# Patient Record
Sex: MALE | Race: WHITE | URBAN - METROPOLITAN AREA
[De-identification: names, ages, dates, MRNs, and addresses within clinical notes are randomized per-mention and may not be internally consistent; named-entity substitution may affect disease eponyms.]

---

## 2017-01-04 ENCOUNTER — DOCTOR'S OFFICE (OUTPATIENT)
Dept: URBAN - METROPOLITAN AREA CLINIC 136 | Facility: CLINIC | Age: 37
Setting detail: OPHTHALMOLOGY
End: 2017-01-04

## 2017-01-04 DIAGNOSIS — H52.13: ICD-10-CM

## 2017-01-04 DIAGNOSIS — H52.223: ICD-10-CM

## 2017-01-04 DIAGNOSIS — H43.393: ICD-10-CM

## 2017-01-04 DIAGNOSIS — H35.413: ICD-10-CM

## 2017-01-04 PROCEDURE — NO CHARGE N/C PROFESSIONAL COURTESY: Performed by: OPTOMETRIST

## 2017-01-04 ASSESSMENT — REFRACTION_AUTOREFRACTION
OS_AXIS: 109
OD_CYLINDER: -0.50
OD_AXIS: 087
OS_CYLINDER: -1.00
OS_SPHERE: -6.00
OD_SPHERE: -6.75

## 2017-01-04 ASSESSMENT — REFRACTION_CURRENTRX
OD_VPRISM_DIRECTION: SV
OS_CYLINDER: -1.25
OS_VPRISM_DIRECTION: SV
OD_AXIS: 085
OS_OVR_VA: 20/
OD_OVR_VA: 20/
OS_AXIS: 092
OS_SPHERE: -6.00
OD_OVR_VA: 20/
OD_SPHERE: -6.50
OD_CYLINDER: -0.25
OS_OVR_VA: 20/
OS_OVR_VA: 20/
OD_OVR_VA: 20/

## 2017-01-04 ASSESSMENT — REFRACTION_OUTSIDERX
OS_CYLINDER: -1.25
OS_VA1: 20/20
OD_VA2: 20/20
OU_VA: 20/20
OD_CYLINDER: -0.50
OS_SPHERE: -6.00
OD_VA1: 20/20
OD_VA3: 20/
OS_VA3: 20/
OD_AXIS: 080
OD_SPHERE: -6.50
OS_VA2: 20/20
OS_AXIS: 105

## 2017-01-04 ASSESSMENT — REFRACTION_MANIFEST
OD_VA3: 20/
OS_VA1: 20/
OS_VA2: 20/
OU_VA: 20/
OD_VA3: 20/
OD_VA1: 20/
OD_VA1: 20/
OS_VA2: 20/
OS_VA3: 20/
OS_VA3: 20/
OD_VA2: 20/
OU_VA: 20/
OS_VA1: 20/
OD_VA2: 20/

## 2017-01-04 ASSESSMENT — SUPERFICIAL PUNCTATE KERATITIS (SPK)
OD_SPK: ABSENT
OS_SPK: ABSENT

## 2017-01-04 ASSESSMENT — CONFRONTATIONAL VISUAL FIELD TEST (CVF)
OS_FINDINGS: FULL
OD_FINDINGS: FULL

## 2017-01-04 ASSESSMENT — AXIALLENGTH_DERIVED
OS_AL: 26.7201
OD_AL: 26.9724

## 2017-01-04 ASSESSMENT — DECREASING TEAR LAKE - SEVERITY SCORE
OD_DEC_TEARLAKE: T
OS_DEC_TEARLAKE: T

## 2017-01-04 ASSESSMENT — VISUAL ACUITY
OS_BCVA: 20/20
OD_BCVA: 20/20-2

## 2017-01-04 ASSESSMENT — KERATOMETRY
OD_AXISANGLE_DEGREES: 029
OS_K1POWER_DIOPTERS: 42.75
OD_K2POWER_DIOPTERS: 43.00
OD_K1POWER_DIOPTERS: 42.75
OS_AXISANGLE_DEGREES: 135
OS_K2POWER_DIOPTERS: 43.00
METHOD_AUTO_MANUAL: AUTO

## 2017-01-04 ASSESSMENT — SPHEQUIV_DERIVED
OS_SPHEQUIV: -6.5
OD_SPHEQUIV: -7

## 2017-01-04 ASSESSMENT — TEAR BREAK UP TIME (TBUT)
OD_TBUT: 7S
OS_TBUT: 7S

## 2018-02-14 ENCOUNTER — DOCTOR'S OFFICE (OUTPATIENT)
Dept: URBAN - METROPOLITAN AREA CLINIC 136 | Facility: CLINIC | Age: 38
Setting detail: OPHTHALMOLOGY
End: 2018-02-14

## 2018-02-14 DIAGNOSIS — H52.13: ICD-10-CM

## 2018-02-14 DIAGNOSIS — H35.413: ICD-10-CM

## 2018-02-14 DIAGNOSIS — H43.393: ICD-10-CM

## 2018-02-14 DIAGNOSIS — D31.31: ICD-10-CM

## 2018-02-14 DIAGNOSIS — H52.223: ICD-10-CM

## 2018-02-14 PROCEDURE — NO CHARGE N/C PROFESSIONAL COURTESY: Performed by: OPTOMETRIST

## 2018-02-14 ASSESSMENT — SUPERFICIAL PUNCTATE KERATITIS (SPK)
OS_SPK: ABSENT
OD_SPK: ABSENT

## 2018-02-14 ASSESSMENT — REFRACTION_OUTSIDERX
OD_AXIS: 095
OD_SPHERE: -6.50
OS_VA3: 20/
OU_VA: 20/20
OD_VA2: 20/20
OD_VA3: 20/
OS_VA2: 20/20
OS_AXIS: 105
OD_VA1: 20/20
OD_CYLINDER: -0.50
OS_VA1: 20/20
OS_SPHERE: -6.00
OS_CYLINDER: -1.25

## 2018-02-14 ASSESSMENT — AXIALLENGTH_DERIVED
OD_AL: 26.8457
OS_AL: 26.9089

## 2018-02-14 ASSESSMENT — VISUAL ACUITY
OD_BCVA: 20/25
OS_BCVA: 20/25

## 2018-02-14 ASSESSMENT — REFRACTION_AUTOREFRACTION
OD_AXIS: 094
OS_AXIS: 104
OD_CYLINDER: -0.50
OS_SPHERE: -6.25
OS_CYLINDER: -1.25
OD_SPHERE: -6.50

## 2018-02-14 ASSESSMENT — REFRACTION_CURRENTRX
OS_OVR_VA: 20/
OS_OVR_VA: 20/
OS_CYLINDER: -1.25
OD_OVR_VA: 20/
OD_OVR_VA: 20/
OS_SPHERE: -6.00
OD_SPHERE: -6.50
OD_CYLINDER: -0.25
OD_OVR_VA: 20/
OS_OVR_VA: 20/
OD_AXIS: 085
OS_VPRISM_DIRECTION: SV
OS_AXIS: 092
OD_VPRISM_DIRECTION: SV

## 2018-02-14 ASSESSMENT — CONFRONTATIONAL VISUAL FIELD TEST (CVF)
OS_FINDINGS: FULL
OD_FINDINGS: FULL

## 2018-02-14 ASSESSMENT — SPHEQUIV_DERIVED
OD_SPHEQUIV: -6.75
OS_SPHEQUIV: -6.875

## 2018-02-14 ASSESSMENT — REFRACTION_MANIFEST
OS_VA1: 20/
OD_VA2: 20/
OD_VA1: 20/
OD_VA1: 20/
OS_VA3: 20/
OD_VA3: 20/
OD_VA3: 20/
OS_VA3: 20/
OU_VA: 20/
OS_VA1: 20/
OS_VA2: 20/
OU_VA: 20/
OS_VA2: 20/
OD_VA2: 20/

## 2018-02-14 ASSESSMENT — KERATOMETRY
METHOD_AUTO_MANUAL: AUTO
OD_AXISANGLE_DEGREES: 029
OD_K2POWER_DIOPTERS: 43.00
OS_K1POWER_DIOPTERS: 42.75
OD_K1POWER_DIOPTERS: 42.75
OS_K2POWER_DIOPTERS: 43.00
OS_AXISANGLE_DEGREES: 135

## 2018-02-14 ASSESSMENT — TEAR BREAK UP TIME (TBUT)
OD_TBUT: 7S
OS_TBUT: 7S

## 2018-02-14 ASSESSMENT — DECREASING TEAR LAKE - SEVERITY SCORE
OD_DEC_TEARLAKE: T
OS_DEC_TEARLAKE: T

## 2018-09-25 ENCOUNTER — DOCTOR'S OFFICE (OUTPATIENT)
Dept: URBAN - METROPOLITAN AREA CLINIC 136 | Facility: CLINIC | Age: 38
Setting detail: OPHTHALMOLOGY
End: 2018-09-25

## 2018-09-25 DIAGNOSIS — D31.31: ICD-10-CM

## 2018-09-25 DIAGNOSIS — H35.413: ICD-10-CM

## 2018-09-25 PROCEDURE — NO CHARGE N/C PROFESSIONAL COURTESY: Performed by: OPTOMETRIST

## 2018-09-25 ASSESSMENT — SPHEQUIV_DERIVED
OS_SPHEQUIV: -6.875
OS_SPHEQUIV: -6.625
OD_SPHEQUIV: -6.75
OD_SPHEQUIV: -6.75

## 2018-09-25 ASSESSMENT — REFRACTION_CURRENTRX
OS_OVR_VA: 20/
OD_CYLINDER: -0.25
OD_OVR_VA: 20/
OD_OVR_VA: 20/
OS_VPRISM_DIRECTION: SV
OD_OVR_VA: 20/
OS_OVR_VA: 20/
OD_AXIS: 085
OD_SPHERE: -6.50
OS_SPHERE: -6.00
OD_VPRISM_DIRECTION: SV
OS_AXIS: 092
OS_OVR_VA: 20/
OS_CYLINDER: -1.25

## 2018-09-25 ASSESSMENT — REFRACTION_AUTOREFRACTION
OS_AXIS: 104
OD_CYLINDER: -0.50
OD_AXIS: 094
OS_SPHERE: -6.25
OS_CYLINDER: -1.25
OD_SPHERE: -6.50

## 2018-09-25 ASSESSMENT — REFRACTION_MANIFEST
OS_CYLINDER: -1.25
OD_VA2: 20/
OD_AXIS: 095
OS_VA1: 20/
OS_VA1: 20/20
OD_VA1: 20/20
OS_AXIS: 105
OD_VA1: 20/
OU_VA: 20/20
OD_CYLINDER: -0.50
OD_VA3: 20/
OS_VA3: 20/
OD_VA2: 20/20
OU_VA: 20/
OS_VA2: 20/20
OS_VA2: 20/
OD_VA3: 20/
OD_SPHERE: -6.50
OS_SPHERE: -6.00
OS_VA3: 20/

## 2018-09-25 ASSESSMENT — SUPERFICIAL PUNCTATE KERATITIS (SPK)
OS_SPK: ABSENT
OD_SPK: ABSENT

## 2018-09-25 ASSESSMENT — VISUAL ACUITY
OS_BCVA: 20/20
OD_BCVA: 20/20

## 2018-09-25 ASSESSMENT — TEAR BREAK UP TIME (TBUT)
OS_TBUT: 7S
OD_TBUT: 7S

## 2018-09-25 ASSESSMENT — DECREASING TEAR LAKE - SEVERITY SCORE
OD_DEC_TEARLAKE: T
OS_DEC_TEARLAKE: T

## 2018-09-25 ASSESSMENT — CONFRONTATIONAL VISUAL FIELD TEST (CVF)
OD_FINDINGS: FULL
OS_FINDINGS: FULL

## 2019-05-14 ENCOUNTER — DOCTOR'S OFFICE (OUTPATIENT)
Dept: URBAN - METROPOLITAN AREA CLINIC 136 | Facility: CLINIC | Age: 39
Setting detail: OPHTHALMOLOGY
End: 2019-05-14
Payer: COMMERCIAL

## 2019-05-14 DIAGNOSIS — H52.223: ICD-10-CM

## 2019-05-14 DIAGNOSIS — D31.31: ICD-10-CM

## 2019-05-14 DIAGNOSIS — H52.13: ICD-10-CM

## 2019-05-14 DIAGNOSIS — H43.393: ICD-10-CM

## 2019-05-14 DIAGNOSIS — H35.413: ICD-10-CM

## 2019-05-14 PROCEDURE — NO CHARGE N/C PROFESSIONAL COURTESY: Performed by: OPTOMETRIST

## 2019-05-14 PROCEDURE — 92250 FUNDUS PHOTOGRAPHY W/I&R: CPT | Performed by: OPTOMETRIST

## 2019-05-14 ASSESSMENT — AXIALLENGTH_DERIVED
OS_AL: 26.7828
OD_AL: 26.8457
OS_AL: 26.5958
OD_AL: 26.7828

## 2019-05-14 ASSESSMENT — REFRACTION_MANIFEST
OD_VA2: 20/
OD_VA1: 20/20
OD_SPHERE: -6.50
OS_VA3: 20/
OS_VA1: 20/
OD_VA3: 20/
OS_VA2: 20/
OS_AXIS: 100
OS_VA3: 20/
OS_SPHERE: -6.00
OS_VA1: 20/20
OS_CYLINDER: -1.25
OD_AXIS: 095
OD_VA3: 20/
OD_CYLINDER: -0.50
OU_VA: 20/20
OD_VA2: 20/20
OS_VA2: 20/20
OD_VA1: 20/
OU_VA: 20/

## 2019-05-14 ASSESSMENT — REFRACTION_CURRENTRX
OS_CYLINDER: -1.25
OD_OVR_VA: 20/
OD_OVR_VA: 20/
OD_CYLINDER: -0.25
OS_AXIS: 092
OD_VPRISM_DIRECTION: SV
OS_OVR_VA: 20/
OD_AXIS: 085
OS_VPRISM_DIRECTION: SV
OS_OVR_VA: 20/
OS_OVR_VA: 20/
OS_SPHERE: -6.00
OD_SPHERE: -6.50
OD_OVR_VA: 20/

## 2019-05-14 ASSESSMENT — TEAR BREAK UP TIME (TBUT)
OD_TBUT: 7S
OS_TBUT: 7S

## 2019-05-14 ASSESSMENT — SPHEQUIV_DERIVED
OS_SPHEQUIV: -6.625
OD_SPHEQUIV: -6.75
OS_SPHEQUIV: -6.25
OD_SPHEQUIV: -6.625

## 2019-05-14 ASSESSMENT — REFRACTION_AUTOREFRACTION
OS_AXIS: 96
OS_SPHERE: -5.50
OD_SPHERE: -6.25
OS_CYLINDER: -1.50
OD_AXIS: 86
OD_CYLINDER: -0.75

## 2019-05-14 ASSESSMENT — KERATOMETRY
METHOD_AUTO_MANUAL: AUTO
OS_AXISANGLE_DEGREES: 135
OD_AXISANGLE_DEGREES: 029
OD_K1POWER_DIOPTERS: 42.75
OS_K1POWER_DIOPTERS: 42.75
OS_K2POWER_DIOPTERS: 43.00
OD_K2POWER_DIOPTERS: 43.00

## 2019-05-14 ASSESSMENT — DECREASING TEAR LAKE - SEVERITY SCORE
OS_DEC_TEARLAKE: T
OD_DEC_TEARLAKE: T

## 2019-05-14 ASSESSMENT — CONFRONTATIONAL VISUAL FIELD TEST (CVF)
OS_FINDINGS: FULL
OD_FINDINGS: FULL

## 2019-05-14 ASSESSMENT — VISUAL ACUITY
OD_BCVA: 20/25
OS_BCVA: 20/25

## 2019-05-14 ASSESSMENT — SUPERFICIAL PUNCTATE KERATITIS (SPK)
OD_SPK: ABSENT
OS_SPK: ABSENT

## 2020-05-21 ENCOUNTER — DOCTOR'S OFFICE (OUTPATIENT)
Dept: URBAN - METROPOLITAN AREA CLINIC 136 | Facility: CLINIC | Age: 40
Setting detail: OPHTHALMOLOGY
End: 2020-05-21
Payer: COMMERCIAL

## 2020-05-21 ENCOUNTER — OPTICAL OFFICE (OUTPATIENT)
Dept: URBAN - METROPOLITAN AREA CLINIC 143 | Facility: CLINIC | Age: 40
Setting detail: OPHTHALMOLOGY
End: 2020-05-21
Payer: COMMERCIAL

## 2020-05-21 DIAGNOSIS — H52.13: ICD-10-CM

## 2020-05-21 DIAGNOSIS — H52.223: ICD-10-CM

## 2020-05-21 PROBLEM — H35.413 LATTICE DEGENERATION; BOTH EYES: Status: ACTIVE | Noted: 2017-01-04

## 2020-05-21 PROBLEM — H43.393 FLOATERS ; BOTH EYES: Status: ACTIVE | Noted: 2017-01-04

## 2020-05-21 PROBLEM — D31.31: Status: ACTIVE | Noted: 2018-02-14

## 2020-05-21 PROCEDURE — V2020 VISION SVCS FRAMES PURCHASES: HCPCS | Performed by: OPTOMETRIST

## 2020-05-21 PROCEDURE — 92310 CONTACT LENS FITTING OU: CPT | Performed by: OPTOMETRIST

## 2020-05-21 PROCEDURE — V2750 ANTI-REFLECTIVE COATING: HCPCS | Performed by: OPTOMETRIST

## 2020-05-21 PROCEDURE — V2783 LENS, >= 1.66 P/>=1.80 G: HCPCS | Performed by: OPTOMETRIST

## 2020-05-21 PROCEDURE — 92015 DETERMINE REFRACTIVE STATE: CPT | Performed by: OPTOMETRIST

## 2020-05-21 PROCEDURE — V2025 EYEGLASSES DELUX FRAMES: HCPCS | Performed by: OPTOMETRIST

## 2020-05-21 PROCEDURE — V2107 SPHEROCYLINDER 4.25D/12-2D: HCPCS | Performed by: OPTOMETRIST

## 2020-05-21 PROCEDURE — 92014 COMPRE OPH EXAM EST PT 1/>: CPT | Performed by: OPTOMETRIST

## 2020-05-21 ASSESSMENT — REFRACTION_MANIFEST
OD_AXIS: 095
OD_VA2: 20/20
OD_SPHERE: -6.50
OU_VA: 20/20
OD_VA1: 20/20
OS_SPHERE: -6.00
OS_VA2: 20/20
OS_AXIS: 100
OS_VA1: 20/20
OD_CYLINDER: -0.75
OS_CYLINDER: -1.25

## 2020-05-21 ASSESSMENT — REFRACTION_CURRENTRX
OD_OVR_VA: 20/
OS_CYLINDER: -1.25
OS_VPRISM_DIRECTION: SV
OS_SPHERE: -6.00
OS_OVR_VA: 20/
OD_VPRISM_DIRECTION: SV
OD_SPHERE: -6.50
OS_AXIS: 092
OD_AXIS: 085
OD_CYLINDER: -0.25

## 2020-05-21 ASSESSMENT — KERATOMETRY
OD_AXISANGLE_DEGREES: 029
OD_K2POWER_DIOPTERS: 43.00
OD_K1POWER_DIOPTERS: 42.75
OS_K2POWER_DIOPTERS: 43.00
OS_K1POWER_DIOPTERS: 42.75
OS_AXISANGLE_DEGREES: 135
METHOD_AUTO_MANUAL: AUTO

## 2020-05-21 ASSESSMENT — REFRACTION_AUTOREFRACTION
OD_SPHERE: -6.00
OS_CYLINDER: -1.00
OS_SPHERE: -5.25
OD_AXIS: 067
OD_CYLINDER: -0.25
OS_AXIS: 095

## 2020-05-21 ASSESSMENT — AXIALLENGTH_DERIVED
OD_AL: 26.534
OS_AL: 26.3505
OS_AL: 26.7828
OD_AL: 26.9089

## 2020-05-21 ASSESSMENT — SPHEQUIV_DERIVED
OS_SPHEQUIV: -5.75
OS_SPHEQUIV: -6.625
OD_SPHEQUIV: -6.125
OD_SPHEQUIV: -6.875

## 2020-05-21 ASSESSMENT — VISUAL ACUITY
OD_BCVA: 20/25
OS_BCVA: 20/20

## 2024-02-21 PROBLEM — R19.7 DIARRHEA OF PRESUMED INFECTIOUS ORIGIN: Status: RESOLVED | Noted: 2020-08-05 | Resolved: 2024-02-21

## 2024-04-02 ENCOUNTER — APPOINTMENT (OUTPATIENT)
Dept: LAB | Facility: CLINIC | Age: 44
End: 2024-04-02
Payer: COMMERCIAL

## 2024-04-02 ENCOUNTER — OFFICE VISIT (OUTPATIENT)
Dept: FAMILY MEDICINE CLINIC | Facility: CLINIC | Age: 44
End: 2024-04-02
Payer: COMMERCIAL

## 2024-04-02 VITALS
BODY MASS INDEX: 27.03 KG/M2 | WEIGHT: 188.8 LBS | HEART RATE: 67 BPM | OXYGEN SATURATION: 94 % | SYSTOLIC BLOOD PRESSURE: 120 MMHG | HEIGHT: 70 IN | DIASTOLIC BLOOD PRESSURE: 96 MMHG

## 2024-04-02 DIAGNOSIS — Z13.220 LIPID SCREENING: ICD-10-CM

## 2024-04-02 DIAGNOSIS — Z11.59 NEED FOR HEPATITIS C SCREENING TEST: ICD-10-CM

## 2024-04-02 DIAGNOSIS — Z11.4 SCREENING FOR HIV (HUMAN IMMUNODEFICIENCY VIRUS): ICD-10-CM

## 2024-04-02 DIAGNOSIS — C64.1 RENAL CELL CARCINOMA OF RIGHT KIDNEY (HCC): ICD-10-CM

## 2024-04-02 DIAGNOSIS — I10 ESSENTIAL HYPERTENSION: ICD-10-CM

## 2024-04-02 DIAGNOSIS — H61.22 IMPACTED CERUMEN OF LEFT EAR: ICD-10-CM

## 2024-04-02 DIAGNOSIS — K21.9 GASTROESOPHAGEAL REFLUX DISEASE WITHOUT ESOPHAGITIS: ICD-10-CM

## 2024-04-02 DIAGNOSIS — C64.1 RENAL CELL CARCINOMA OF RIGHT KIDNEY (HCC): Primary | ICD-10-CM

## 2024-04-02 PROBLEM — R07.9 CHEST PAIN DUE TO GERD: Status: ACTIVE | Noted: 2024-04-02

## 2024-04-02 LAB
ANION GAP SERPL CALCULATED.3IONS-SCNC: 9 MMOL/L (ref 4–13)
BUN SERPL-MCNC: 14 MG/DL (ref 5–25)
CALCIUM SERPL-MCNC: 9.3 MG/DL (ref 8.4–10.2)
CHLORIDE SERPL-SCNC: 101 MMOL/L (ref 96–108)
CHOLEST SERPL-MCNC: 228 MG/DL
CO2 SERPL-SCNC: 27 MMOL/L (ref 21–32)
CREAT SERPL-MCNC: 0.87 MG/DL (ref 0.6–1.3)
GFR SERPL CREATININE-BSD FRML MDRD: 107 ML/MIN/1.73SQ M
GLUCOSE P FAST SERPL-MCNC: 106 MG/DL (ref 65–99)
HCV AB SER QL: NORMAL
HDLC SERPL-MCNC: 44 MG/DL
HIV 1+2 AB+HIV1 P24 AG SERPL QL IA: NORMAL
HIV 2 AB SERPL QL IA: NORMAL
HIV1 AB SERPL QL IA: NORMAL
HIV1 P24 AG SERPL QL IA: NORMAL
LDLC SERPL CALC-MCNC: 112 MG/DL (ref 0–100)
NONHDLC SERPL-MCNC: 184 MG/DL
POTASSIUM SERPL-SCNC: 4.3 MMOL/L (ref 3.5–5.3)
SODIUM SERPL-SCNC: 137 MMOL/L (ref 135–147)
TRIGL SERPL-MCNC: 358 MG/DL

## 2024-04-02 PROCEDURE — 69210 REMOVE IMPACTED EAR WAX UNI: CPT | Performed by: INTERNAL MEDICINE

## 2024-04-02 PROCEDURE — 99386 PREV VISIT NEW AGE 40-64: CPT | Performed by: INTERNAL MEDICINE

## 2024-04-02 PROCEDURE — 36415 COLL VENOUS BLD VENIPUNCTURE: CPT

## 2024-04-02 PROCEDURE — 80061 LIPID PANEL: CPT

## 2024-04-02 PROCEDURE — 87389 HIV-1 AG W/HIV-1&-2 AB AG IA: CPT

## 2024-04-02 PROCEDURE — 80048 BASIC METABOLIC PNL TOTAL CA: CPT

## 2024-04-02 PROCEDURE — 86803 HEPATITIS C AB TEST: CPT

## 2024-04-02 PROCEDURE — 99214 OFFICE O/P EST MOD 30 MIN: CPT | Performed by: INTERNAL MEDICINE

## 2024-04-02 RX ORDER — AMLODIPINE BESYLATE 5 MG/1
5 TABLET ORAL DAILY
Qty: 30 TABLET | Refills: 5 | Status: SHIPPED | OUTPATIENT
Start: 2024-04-02

## 2024-04-02 NOTE — PROGRESS NOTES
Name: Adriano Bryan      : 1980      MRN: [id number]  Encounter Provider: Jesus Chaudhry MD  Encounter Date: 2024   Encounter department: [facility] PRIMARY CARE    Assessment & Plan     Assessment & Plan  1. Essential hypertension.  The patient's blood pressure readings from 2018 to 2019 were 123/90, and 137/96 at [facility], and 139/101 at Urology appointment in 2023. Both the patient's home blood pressure readings indicate essential hypertension. Amlodipine 5 mg daily will be started today. Potential side effects, including lightheadedness upon standing from a seated position, flushing, headaches, and leg swelling, have been discussed with the patient. The patient has been advised to monitor his blood pressure at home, with weekly checks once blood pressure is well-managed.    2. Gastroesophageal reflux disease.  The patient will continue on Nexium.    3. Renal cell carcinoma.  The patient underwent an ultrasound in 2023, which revealed a small cyst. The patient will continue to follow up with Urology on an annual basis.    4. Health maintenance.  The patient's weight is marginally overweight, however, his ideal body weight is 170. Blood work will be conducted to assess cholesterol levels, HIV, and hepatitis C. Can consider audiogram if tinnitus persists.    5.  Left cerumen impaction  Cleared with a combination of irrigation and curetting.  He had scant bleeding in the external canal which was controlled with a cotton tip applicator.  I did apply a small amount of gauze which I asked that he may remove when he gets home.    Follow-up  The patient is scheduled for a follow-up visit in 3 weeks.         Subjective     History of Present Illness  The patient is a 43-year-old male who presents for evaluation of multiple medical concerns. He is accompanied by his wife.    The patient was evaluated by his urologist in 2023 for a follow-up for a history of renal cancer, during  which his blood pressure was noted to be elevated. The urologist suggested that the elevated blood pressure was not related to his renal cancer and recommended a consultation with a primary care physician. Despite three cancelled appointments at various facilities, the patient's wife has been attempting to schedule an appointment since 12/2023. The patient's diastolic home blood pressure readings have consistently remained around 100.  He has no history of diabetes, cardiac, pulmonary, or neurological conditions. He also denies any history of cerebrovascular accidents or migraines. The patient wears contact lenses. Genetic counseling was conducted approximately 2 to 3 weeks ago, with results pending. 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    The patient is currently on Nexium for gastroesophageal reflux disease, which he reports as effective. However, without it, he experiences heartburn. He identifies acidic foods such as red sauces, bearings, beer, and cranberry juice as triggers. He tolerates fried chicken over [place] fried chicken without any adverse reactions. However, after consuming breakfast wages, he experiences pruritus in his hands, face, feet, and throat. Benadryl provides relief.       Review of Systems    Past Medical History:   Diagnosis Date    GERD (gastroesophageal reflux disease)     History of kidney cancer      Past Surgical History:   Procedure Laterality Date    PARTIAL NEPHRECTOMY Right 2016    Robot-assisted, LV[facility]. Post-op retroperitoneal hematoma.     Family History   Problem Relation Age of Onset    Cancer Mother         Bladder    Ulcerative colitis Mother     Hypertension Father     Diabetes Father     Hyperlipidemia Father     No Known Problems Brother      Social History     Socioeconomic History    Marital status: /Civil Union     Spouse name: None    Number of  children: None    Years of education: None    Highest education level: None   Occupational History    None   Tobacco Use    Smoking status: Former    Smokeless tobacco: Never   Vaping Use    Vaping status: Never Used   Substance and Sexual Activity    Alcohol use: No    Drug use: No    Sexual activity: None   Other Topics Concern    None   Social History Narrative    None     Social Determinants of Health     Financial Resource Strain: Low Risk  (4/1/2024)    Received from [facility]    Overall Financial Resource Strain (CARDIA)     Difficulty of Paying Living Expenses: Not hard at all   Food Insecurity: No Food Insecurity (4/1/2024)    Received from [facility]    Hunger Vital Sign     Worried About Running Out of Food in the Last Year: Never true     Ran Out of Food in the Last Year: Never true   Transportation Needs: No Transportation Needs (4/1/2024)    Received from [facility]    PRAPARE - Transportation     Lack of Transportation (Medical): No     Lack of Transportation (Non-Medical): No   Physical Activity: Not on file   Stress: No Stress Concern Present (4/1/2024)    Received from [facility]    [employer] - Occupational Stress Questionnaire     Feeling of Stress : Not at all   Social Connections: Feeling Socially Integrated (4/1/2024)    Received from [facility]    OASIS : Social Isolation     How often do you feel lonely or isolated from those around you?: Never   Intimate Partner Violence: Not At Risk (4/1/2024)    Received from [facility]    Humiliation, Afraid, Rape, and Kick questionnaire     Fear of Current or Ex-Partner: No     Emotionally Abused: No     Physically Abused: No     Sexually Abused: No   Housing Stability: Unknown (4/1/2024)    Received from [facility]    Housing Stability Vital Sign     Unable to Pay for Housing in the Last Year:  "No     Number of Places Lived in the Last Year: Not on file     Unstable Housing in the Last Year: No     Current Outpatient Medications on File Prior to Visit   Medication Sig    esomeprazole (NexIUM) 20 mg capsule Take 20 mg by mouth every morning before breakfast    mupirocin (BACTROBAN) 2 % nasal ointment into each nostril 2 (two) times a day (Patient not taking: Reported on 8/5/2020)    ranitidine (ZANTAC) 150 MG capsule Take 150 mg by mouth 2 (two) times a day (Patient not taking: Reported on 11/8/2023)     Allergies   Allergen Reactions    Other Anaphylaxis     Cooking Oils      Immunization History   Administered Date(s) Administered    COVID-19 PFIZER VACCINE 0.3 ML IM 12/23/2021, 01/13/2022       Objective       Vitals:    04/02/24 1100 04/02/24 1119 04/02/24 1120   BP: 138/78 120/86 120/96   BP Location: Left arm Right arm Left arm   Patient Position: Sitting     Cuff Size: Large Adult Adult   Pulse: 67     SpO2: 94%     Weight: 85.6 kg (188 lb 12.8 oz)     Height: 5' 10\" (1.778 m)        Physical Exam    Physical Exam  Constitutional:       General: He is not in acute distress.     Appearance: He is well-developed. He is not ill-appearing.   HENT:      Right Ear: Tympanic membrane and external ear normal.      Left Ear: Tympanic membrane and external ear normal. There is impacted cerumen.      Nose: Nose normal.      Mouth/Throat:      Mouth: Mucous membranes are moist.      Pharynx: Oropharynx is clear.   Eyes:      General: No scleral icterus.     Conjunctiva/sclera: Conjunctivae normal.      Pupils: Pupils are equal, round, and reactive to light.   Neck:      Thyroid: No thyromegaly.   Cardiovascular:      Rate and Rhythm: Normal rate and regular rhythm.      Heart sounds: Normal heart sounds. No murmur heard.     No friction rub. No gallop.   Pulmonary:      Effort: Pulmonary effort is normal.      Breath sounds: Normal breath sounds. No wheezing or rales.   Abdominal:      General: Bowel sounds are " normal. There is no distension.      Palpations: Abdomen is soft. There is no mass.      Tenderness: There is no abdominal tenderness.   Musculoskeletal:         General: No swelling.   Lymphadenopathy:      Cervical: No cervical adenopathy.   Neurological:      Comments: Cranial nerves II-XII intact, motor is 5/5 in all major groups.       Ear cerumen removal    Date/Time: 4/2/2024 11:15 AM    Performed by: Jesus Chaudhry MD  Authorized by: Jesus Chaudhry MD    Patient location:  Clinic  Procedure details:     Local anesthetic:  None    Location:  L ear    Procedure type: irrigation with instrumentation      Instrumentation: curette      Approach:  External  Post-procedure details:     Complication:  Bleeding (scant)    Hearing quality:  Improved

## 2024-04-22 ENCOUNTER — OFFICE VISIT (OUTPATIENT)
Dept: FAMILY MEDICINE CLINIC | Facility: CLINIC | Age: 44
End: 2024-04-22
Payer: COMMERCIAL

## 2024-04-22 VITALS
WEIGHT: 190.8 LBS | OXYGEN SATURATION: 100 % | BODY MASS INDEX: 27.32 KG/M2 | DIASTOLIC BLOOD PRESSURE: 84 MMHG | HEIGHT: 70 IN | SYSTOLIC BLOOD PRESSURE: 110 MMHG | HEART RATE: 74 BPM

## 2024-04-22 DIAGNOSIS — C64.1 RENAL CELL CARCINOMA OF RIGHT KIDNEY (HCC): ICD-10-CM

## 2024-04-22 DIAGNOSIS — R73.01 IMPAIRED FASTING GLUCOSE: ICD-10-CM

## 2024-04-22 DIAGNOSIS — E78.2 MIXED HYPERLIPIDEMIA: Primary | ICD-10-CM

## 2024-04-22 PROCEDURE — 99214 OFFICE O/P EST MOD 30 MIN: CPT | Performed by: INTERNAL MEDICINE

## 2024-04-22 NOTE — PATIENT INSTRUCTIONS
Fruits Serving size Total fiber (grams)*   Raspberries 1 cup 8.0   Pear, with skin 1 medium 5.5   Apple, with skin 1 medium 4.4   Banana 1 medium 3.1   Orange 1 medium 3.1   Strawberries (halves) 1 cup 3.0   Figs, dried 2 medium 1.6   Raisins 1 ounce (60 raisins) 1.0     Grains, cereal and pasta Serving size Total fiber (grams)*   Spaghetti, whole-wheat, cooked 1 cup 6.3   Barley, pearled, cooked 1 cup 6.0   Bran flakes 3/4 cup 5.5   Oat bran muffin 1 medium 5.2   Oatmeal, instant, cooked 1 cup 4.0   Popcorn, air-popped 3 cups 3.6   Brown rice, cooked 1 cup 3.5   Bread, rye 1 slice 1.9   Bread, whole-wheat 1 slice 1.9     Legumes, nuts and seeds Serving size Total fiber (grams)*   Split peas, boiled 1 cup 16.3   Lentils, boiled 1 cup 15.6   Black beans, boiled 1 cup 15.0   Lima beans, boiled 1 cup 13.2   Baked beans, vegetarian, canned, cooked 1 cup 10.4   Almonds 1 ounce (23 nuts) 3.5   Pistachio nuts 1 ounce (49 nuts) 2.9   Pecans 1 ounce (19 halves) 2.7     Vegetables Serving size Total fiber (grams)*   Artichoke, boiled 1 medium 10.3   Green peas, boiled 1 cup 8.8   Broccoli, boiled 1 cup 5.1   Turnip greens, boiled 1 cup 5.0   [place] sprouts, boiled 1 cup 4.1   Sweet corn, boiled 1 cup 3.6   Potato, with skin, baked 1 small 2.9   Tomato paste, canned 1/4 cup 2.7   Carrot, raw 1 medium 1.7

## 2024-04-22 NOTE — PROGRESS NOTES
"Name: Robert Bryan      : 1980      MRN: [id number]  Encounter Provider: Jesus Chaudhry MD  Encounter Date: 2024   Encounter department: [facility] PRIMARY CARE    Assessment & Plan     Assessment & Plan  1.  Mixed hyperlipidemia  The patient's 10-year ASCVD risk is approximately 2 percent. His blood pressure readings are within the normal range. The patient was counseled on the importance of weight loss, aiming to reduce his cholesterol and triglyceride levels. Lifestyle modifications were recommended, including the reduction of fried foods, reduction of meat and fat intake, and incorporating high-soluble fiber foods into his diet.    2.  History of right renal cell carcinoma.  He had genetic testing but hasn't gotten the results yet from the genetic counselor.  He will follow-up with his urologist in December.    3.  Impaired fasting glucose  Recommended strategies for weight loss which should help lower his blood sugar.    Follow-up  The patient is scheduled for a follow-up visit in 6 months.     No orders of the defined types were placed in this encounter.      Subjective      History of Present Illness  The patient is a 43-year-old male who presents for a regular follow-up.    The patient's weight has remained relatively stable. He has previously consulted with a genetic counselor and is uncertain of the results.  Review of Systems      Objective     /84 (BP Location: Left arm, Patient Position: Sitting, Cuff Size: Large)   Pulse 74   Ht 5' 10\" (1.778 m)   Wt 86.5 kg (190 lb 12.8 oz)   SpO2 100%   BMI 27.38 kg/m²     Wt Readings from Last 3 Encounters:   24 86.5 kg (190 lb 12.8 oz)   24 85.6 kg (188 lb 12.8 oz)   24 85.9 kg (189 lb 6.4 oz)      Physical Exam  Vital Signs  Patient's weight is 190.  Physical Exam      "

## 2024-10-15 DIAGNOSIS — I10 ESSENTIAL HYPERTENSION: ICD-10-CM

## 2024-10-16 RX ORDER — AMLODIPINE BESYLATE 5 MG/1
5 TABLET ORAL DAILY
Qty: 30 TABLET | Refills: 5 | Status: SHIPPED | OUTPATIENT
Start: 2024-10-16

## 2025-04-28 ENCOUNTER — TELEPHONE (OUTPATIENT)
Dept: FAMILY MEDICINE CLINIC | Facility: CLINIC | Age: 45
End: 2025-04-28

## 2025-04-28 NOTE — TELEPHONE ENCOUNTER
Lmom asking pt if he will be staying with our practice or if he will be switching practices due to Dr. Chaudhry leaving. Pt is to call back and if he wishes to stay here please schedule pt pt his annual physical.

## 2025-05-13 ENCOUNTER — TELEPHONE (OUTPATIENT)
Dept: FAMILY MEDICINE CLINIC | Facility: CLINIC | Age: 45
End: 2025-05-13

## 2025-05-13 NOTE — TELEPHONE ENCOUNTER
Patient attribution    Patient is now under the care of Dr Dylan Bobby    Please remove our office and update the chart    Thank you

## 2025-05-14 DIAGNOSIS — I10 ESSENTIAL HYPERTENSION: ICD-10-CM

## 2025-05-15 RX ORDER — AMLODIPINE BESYLATE 5 MG/1
5 TABLET ORAL DAILY
Qty: 30 TABLET | Refills: 0 | Status: SHIPPED | OUTPATIENT
Start: 2025-05-15

## 2025-06-10 DIAGNOSIS — I10 ESSENTIAL HYPERTENSION: ICD-10-CM

## 2025-06-10 RX ORDER — AMLODIPINE BESYLATE 5 MG/1
5 TABLET ORAL DAILY
Qty: 30 TABLET | Refills: 0 | OUTPATIENT
Start: 2025-06-10

## 2025-07-21 DIAGNOSIS — I10 ESSENTIAL HYPERTENSION: ICD-10-CM

## 2025-07-22 RX ORDER — AMLODIPINE BESYLATE 5 MG/1
5 TABLET ORAL DAILY
Qty: 30 TABLET | Refills: 0 | OUTPATIENT
Start: 2025-07-22